# Patient Record
Sex: MALE | Race: WHITE | NOT HISPANIC OR LATINO | URBAN - METROPOLITAN AREA
[De-identification: names, ages, dates, MRNs, and addresses within clinical notes are randomized per-mention and may not be internally consistent; named-entity substitution may affect disease eponyms.]

---

## 2018-04-26 PROBLEM — 398050005 DIVERTICULAR DISEASE OF COLON: Status: ACTIVE | Noted: 2018-04-26

## 2018-04-26 PROBLEM — 38341003 HYPERTENSION: Status: ACTIVE | Noted: 2018-04-26

## 2018-04-26 PROBLEM — 230690007 CEREBROVASCULAR ACCIDENT: Status: ACTIVE | Noted: 2018-04-26

## 2018-04-26 PROBLEM — 4556007 GASTRITIS: Status: ACTIVE | Noted: 2018-04-26

## 2018-04-26 PROBLEM — 73430006 SLEEP APNEA: Status: ACTIVE | Noted: 2018-04-26

## 2018-04-26 PROBLEM — 13644009 HYPERCHOLESTEROLEMIA: Status: ACTIVE | Noted: 2018-04-26

## 2018-04-26 PROBLEM — 70153002 HEMORRHOIDS: Status: ACTIVE | Noted: 2018-04-26

## 2018-04-26 PROBLEM — 22298006 MYOCARDIAL INFARCTION: Status: ACTIVE | Noted: 2018-04-26

## 2018-05-29 PROBLEM — 428283002 HISTORY OF POLYP OF COLON: Status: ACTIVE | Noted: 2018-05-29

## 2018-05-29 PROBLEM — 235595009 GASTROESOPHAGEAL REFLUX DISEASE: Status: ACTIVE | Noted: 2018-05-29

## 2021-05-10 ENCOUNTER — OFFICE VISIT (OUTPATIENT)
Dept: URBAN - METROPOLITAN AREA CLINIC 44 | Facility: CLINIC | Age: 76
End: 2021-05-10

## 2021-05-27 ENCOUNTER — OFFICE VISIT (OUTPATIENT)
Dept: URBAN - METROPOLITAN AREA CLINIC 13 | Facility: CLINIC | Age: 76
End: 2021-05-27

## 2021-08-28 ENCOUNTER — TELEPHONE ENCOUNTER (OUTPATIENT)
Dept: URBAN - METROPOLITAN AREA CLINIC 13 | Facility: CLINIC | Age: 76
End: 2021-08-28

## 2021-08-28 RX ORDER — MONTELUKAST 10 MG/1
TABLET, FILM COATED ORAL
OUTPATIENT
End: 2021-05-10

## 2021-08-28 RX ORDER — OMEPRAZOLE 40 MG/1
CAPSULE, DELAYED RELEASE ORAL
OUTPATIENT
End: 2021-05-10

## 2021-08-28 RX ORDER — PREDNISONE 2.5 MG/1
TABLET ORAL
OUTPATIENT
End: 2021-05-10

## 2021-08-29 ENCOUNTER — TELEPHONE ENCOUNTER (OUTPATIENT)
Dept: URBAN - METROPOLITAN AREA CLINIC 13 | Facility: CLINIC | Age: 76
End: 2021-08-29

## 2021-08-29 RX ORDER — FOLIC ACID/VIT B COMPLEX AND C 400 MCG
TABLET ORAL
Status: ACTIVE | COMMUNITY

## 2021-08-29 RX ORDER — OMEPRAZOLE 40 MG/1
CAPSULE, DELAYED RELEASE ORAL
Status: ACTIVE | COMMUNITY

## 2021-08-29 RX ORDER — METOPROLOL TARTRATE 25 MG/1
TABLET, FILM COATED ORAL
Status: ACTIVE | COMMUNITY

## 2021-08-29 RX ORDER — DIPHENHYDRAMINE HCL 2 %
CREAM (GRAM) TOPICAL
Status: ACTIVE | COMMUNITY

## 2021-08-29 RX ORDER — METHOTREXATE 2.5 MG/1
TABLET ORAL
Status: ACTIVE | COMMUNITY

## 2021-08-29 RX ORDER — ATORVASTATIN CALCIUM 40 MG/1
TABLET ORAL
Status: ACTIVE | COMMUNITY

## 2021-08-29 RX ORDER — ASPIRIN 81 MG/1
TABLET, COATED ORAL
Status: ACTIVE | COMMUNITY

## 2021-08-29 RX ORDER — FOLIC ACID 1 MG/1
TABLET ORAL
Status: ACTIVE | COMMUNITY

## 2021-08-29 RX ORDER — FINASTERIDE 5 MG/1
TABLET, FILM COATED ORAL
Status: ACTIVE | COMMUNITY

## 2023-03-21 ENCOUNTER — DASHBOARD ENCOUNTERS (OUTPATIENT)
Age: 78
End: 2023-03-21

## 2023-03-21 ENCOUNTER — OFFICE VISIT (OUTPATIENT)
Dept: URBAN - METROPOLITAN AREA CLINIC 48 | Facility: CLINIC | Age: 78
End: 2023-03-21
Payer: MEDICARE

## 2023-03-21 VITALS
HEART RATE: 55 BPM | SYSTOLIC BLOOD PRESSURE: 124 MMHG | HEIGHT: 61 IN | WEIGHT: 152.4 LBS | BODY MASS INDEX: 28.77 KG/M2 | DIASTOLIC BLOOD PRESSURE: 72 MMHG | TEMPERATURE: 98.1 F

## 2023-03-21 DIAGNOSIS — Z86.010 PERSONAL HISTORY OF COLON POLYPS: ICD-10-CM

## 2023-03-21 DIAGNOSIS — R13.19 CERVICAL DYSPHAGIA: ICD-10-CM

## 2023-03-21 DIAGNOSIS — K59.09 CHRONIC CONSTIPATION: ICD-10-CM

## 2023-03-21 DIAGNOSIS — Z80.0 FAMILY HISTORY OF COLON CANCER (HIGH RISK): ICD-10-CM

## 2023-03-21 DIAGNOSIS — K21.9 ACID REFLUX: ICD-10-CM

## 2023-03-21 PROBLEM — 235595009: Status: ACTIVE | Noted: 2023-03-21

## 2023-03-21 PROBLEM — 40739000: Status: ACTIVE | Noted: 2023-03-21

## 2023-03-21 PROCEDURE — 99214 OFFICE O/P EST MOD 30 MIN: CPT | Performed by: PHYSICIAN ASSISTANT

## 2023-03-21 RX ORDER — FINASTERIDE 5 MG/1
1 TABLET TABLET, FILM COATED ORAL ONCE A DAY
Status: ACTIVE | COMMUNITY

## 2023-03-21 RX ORDER — METOPROLOL TARTRATE 25 MG/1
1 TABLET WITH FOOD TABLET, FILM COATED ORAL TWICE A DAY
Status: ACTIVE | COMMUNITY

## 2023-03-21 RX ORDER — FOLIC ACID 1 MG/1
1 TABLET TABLET ORAL ONCE A DAY
Status: ACTIVE | COMMUNITY

## 2023-03-21 RX ORDER — OMEPRAZOLE 40 MG/1
1 CAPSULE 30 MINUTES BEFORE MORNING MEAL CAPSULE, DELAYED RELEASE ORAL ONCE A DAY
Status: ACTIVE | COMMUNITY

## 2023-03-21 RX ORDER — DIPHENHYDRAMINE HCL 2 %
1 CAPSULE AT BEDTIME AS NEEDED CREAM (GRAM) TOPICAL ONCE A DAY
Status: ACTIVE | COMMUNITY

## 2023-03-21 RX ORDER — ASPIRIN 81 MG/1
1 TABLET TABLET, COATED ORAL ONCE A DAY
Status: ACTIVE | COMMUNITY

## 2023-03-21 RX ORDER — ATORVASTATIN CALCIUM 40 MG/1
1 TABLET TABLET ORAL ONCE A DAY
Status: ACTIVE | COMMUNITY

## 2023-03-21 RX ORDER — METHOTREXATE 2.5 MG/1
AS DIRECTED TABLET ORAL
Status: ACTIVE | COMMUNITY

## 2023-03-21 RX ORDER — OMEPRAZOLE 40 MG/1
1 CAPSULE 30 MINUTES BEFORE MORNING MEAL CAPSULE, DELAYED RELEASE ORAL ONCE A DAY
OUTPATIENT

## 2023-03-21 RX ORDER — FOLIC ACID/VIT B COMPLEX AND C 400 MCG
1 TAQBLET TABLET ORAL ONCE A DAY
Status: ACTIVE | COMMUNITY

## 2023-03-21 NOTE — PHYSICAL EXAM NEUROLOGIC:
oriented to person, place and time , normal sensation , short and long term memory intact, pt has some stuttering with speech

## 2023-03-21 NOTE — HPI-TODAY'S VISIT:
Pt was last seen via telehealth 5/10/21 for progressively worsening, chronic dysphagia, especially to solid foods and pills. He has h/o CVA and has seen speech therapy in the past. 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 At that time, he had recently seen Dr. Gotti for these issues at [facility]. He was also in the ED 5/2/21 with food bolus that resolved without endoscopic intervention. He was on Omeprazole daily which controls reflux symptoms. He had not had an EGD since 2008. He also has a strong family h/o CRC in his brother and father, and last had a colonoscopy in 2018 with multiple TA colon polyps removed; he was due for surveillance in 2021. He reports that he had partial colon resection several years ago secondary to diverticulitis. He has h/o CAD and has had multiple stents placed back in 2005. EGD was ordered with plan for colonoscopy later in the year, but neither were done.   He returns today again with c/o dysphagia to certain solid foods, especially meats. He also has excessive mucus in his throat and feels this may be related to chronic rhinorrhea and post-nasal drainage. The mucus also causes globus sensation and difficulty swallowing. He has not recently seen ENT. He continues to take Omeprazole daily with control of reflux symptoms. He states since his bowel surgery, he has had chronic constipation with daily movements that appear like small hard balls; he strains; denies blood in the stool or abdominal pain. He is taking a fiber supplement and stool softeners daily; he is very sensitive to laxatives. Per review of [facility] chart, labs in December 2022 showed a normal CBC and CMP. Pt was seen by Dr. Ruiz 2/1/22 in follow up as he had not been seen by cardiology for some time; his note from that time states he anticipates a low CV risk for endoscopy.

## 2023-04-20 ENCOUNTER — OFFICE VISIT (OUTPATIENT)
Dept: URBAN - METROPOLITAN AREA SURGERY CENTER 27 | Facility: SURGERY CENTER | Age: 78
End: 2023-04-20